# Patient Record
Sex: FEMALE | Race: BLACK OR AFRICAN AMERICAN | Employment: FULL TIME | URBAN - METROPOLITAN AREA
[De-identification: names, ages, dates, MRNs, and addresses within clinical notes are randomized per-mention and may not be internally consistent; named-entity substitution may affect disease eponyms.]

---

## 2019-06-12 ENCOUNTER — HOSPITAL ENCOUNTER (EMERGENCY)
Age: 64
Discharge: HOME | End: 2019-06-12
Attending: EMERGENCY MEDICINE
Payer: COMMERCIAL

## 2019-06-12 VITALS
OXYGEN SATURATION: 100 % | RESPIRATION RATE: 16 BRPM | HEART RATE: 87 BPM | DIASTOLIC BLOOD PRESSURE: 78 MMHG | SYSTOLIC BLOOD PRESSURE: 128 MMHG | TEMPERATURE: 97.8 F

## 2019-06-12 DIAGNOSIS — I50.22 CHRONIC SYSTOLIC HEART FAILURE (HCC): Primary | ICD-10-CM

## 2019-06-12 DIAGNOSIS — Z95.810 AUTOMATIC IMPLANTABLE CARDIAC DEFIBRILLATOR IN SITU: ICD-10-CM

## 2019-06-12 LAB
ALBUMIN SERPL-MCNC: 3.6 G/DL (ref 3.5–5)
ALBUMIN/GLOB SERPL: 0.7 {RATIO} (ref 1.1–2.2)
ALP SERPL-CCNC: 66 U/L (ref 45–117)
ALT SERPL-CCNC: 30 U/L (ref 12–78)
ANION GAP SERPL CALC-SCNC: 5 MMOL/L (ref 5–15)
AST SERPL-CCNC: 28 U/L (ref 15–37)
ATRIAL RATE: 75 BPM
BASOPHILS # BLD: 0 K/UL (ref 0–0.1)
BASOPHILS NFR BLD: 1 % (ref 0–1)
BILIRUB SERPL-MCNC: 0.3 MG/DL (ref 0.2–1)
BNP SERPL-MCNC: 61 PG/ML
BUN SERPL-MCNC: 14 MG/DL (ref 6–20)
BUN/CREAT SERPL: 14 (ref 12–20)
CALCIUM SERPL-MCNC: 8.2 MG/DL (ref 8.5–10.1)
CALCULATED P AXIS, ECG09: -15 DEGREES
CALCULATED R AXIS, ECG10: -31 DEGREES
CALCULATED T AXIS, ECG11: 9 DEGREES
CHLORIDE SERPL-SCNC: 103 MMOL/L (ref 97–108)
CO2 SERPL-SCNC: 31 MMOL/L (ref 21–32)
COMMENT, HOLDF: NORMAL
CREAT SERPL-MCNC: 1 MG/DL (ref 0.55–1.02)
DIAGNOSIS, 93000: NORMAL
DIFFERENTIAL METHOD BLD: ABNORMAL
EOSINOPHIL # BLD: 0.1 K/UL (ref 0–0.4)
EOSINOPHIL NFR BLD: 3 % (ref 0–7)
ERYTHROCYTE [DISTWIDTH] IN BLOOD BY AUTOMATED COUNT: 14.7 % (ref 11.5–14.5)
GLOBULIN SER CALC-MCNC: 5.1 G/DL (ref 2–4)
GLUCOSE SERPL-MCNC: 85 MG/DL (ref 65–100)
HCT VFR BLD AUTO: 39.2 % (ref 35–47)
HGB BLD-MCNC: 12.3 G/DL (ref 11.5–16)
IMM GRANULOCYTES # BLD AUTO: 0 K/UL (ref 0–0.04)
IMM GRANULOCYTES NFR BLD AUTO: 0 % (ref 0–0.5)
LYMPHOCYTES # BLD: 1.4 K/UL (ref 0.8–3.5)
LYMPHOCYTES NFR BLD: 40 % (ref 12–49)
MCH RBC QN AUTO: 30.1 PG (ref 26–34)
MCHC RBC AUTO-ENTMCNC: 31.4 G/DL (ref 30–36.5)
MCV RBC AUTO: 95.8 FL (ref 80–99)
MONOCYTES # BLD: 0.3 K/UL (ref 0–1)
MONOCYTES NFR BLD: 8 % (ref 5–13)
NEUTS SEG # BLD: 1.7 K/UL (ref 1.8–8)
NEUTS SEG NFR BLD: 48 % (ref 32–75)
NRBC # BLD: 0 K/UL (ref 0–0.01)
NRBC BLD-RTO: 0 PER 100 WBC
P-R INTERVAL, ECG05: 248 MS
PLATELET # BLD AUTO: 138 K/UL (ref 150–400)
PMV BLD AUTO: 12.3 FL (ref 8.9–12.9)
POTASSIUM SERPL-SCNC: 4 MMOL/L (ref 3.5–5.1)
PROT SERPL-MCNC: 8.7 G/DL (ref 6.4–8.2)
Q-T INTERVAL, ECG07: 396 MS
QRS DURATION, ECG06: 82 MS
QTC CALCULATION (BEZET), ECG08: 442 MS
RBC # BLD AUTO: 4.09 M/UL (ref 3.8–5.2)
SAMPLES BEING HELD,HOLD: NORMAL
SODIUM SERPL-SCNC: 139 MMOL/L (ref 136–145)
TROPONIN I SERPL-MCNC: 0.05 NG/ML
VENTRICULAR RATE, ECG03: 75 BPM
WBC # BLD AUTO: 3.5 K/UL (ref 3.6–11)

## 2019-06-12 PROCEDURE — 84484 ASSAY OF TROPONIN QUANT: CPT

## 2019-06-12 PROCEDURE — 80053 COMPREHEN METABOLIC PANEL: CPT

## 2019-06-12 PROCEDURE — 85025 COMPLETE CBC W/AUTO DIFF WBC: CPT

## 2019-06-12 PROCEDURE — 83880 ASSAY OF NATRIURETIC PEPTIDE: CPT

## 2019-06-12 PROCEDURE — 36415 COLL VENOUS BLD VENIPUNCTURE: CPT

## 2019-06-12 PROCEDURE — 99284 EMERGENCY DEPT VISIT MOD MDM: CPT

## 2019-06-12 PROCEDURE — 93005 ELECTROCARDIOGRAM TRACING: CPT

## 2019-06-12 NOTE — ED PROVIDER NOTES
Pt reports she has a pacemaker, is not clear on why but states she has never had stents/heart attack. Reports that she noted that it beeped a couple of days ago in the morning, then has done it again each morning since. This AM when she woke up, states \"I felt clammy, not sweaty but clammy,\" and general unease, lasting about an hour. Denies pain, SOB, recent fever or other illness. Has not acute complaints at this time. Reports that she has not had a \"shock\" from her device in many years. No recent medication changes. Does report that is has been [place] long time\" since she saw her cardiologist, and reports that she has felt too anxious to make an appointment.            Past Medical History:   Diagnosis Date    Automatic implantable cardiac defibrillator in situ 3/29/2012    [facility] failure [facility]     Hypertension     Other acute and subacute form of ischemic heart disease ([address]     Psychiatric disorder     ANXIETY    Stroke ([facility] [facility] 2005    TIA    Thyroid disease        Past Surgical History:   Procedure Laterality Date    HX GYN  1980's    MYOMECTOMY    HX OTHER SURGICAL  2005    THYROIDECTOMY    HX PACEMAKER  2010    ICD    HX TONSILLECTOMY           Family History:   Problem Relation Age of Onset    Kidney Disease Mother     Diabetes Mother     Lung Disease Father     Cancer Father         lung caner    Glaucoma Brother        Social History     Socioeconomic History    Marital status:      Spouse name: Not on file    Number of children: Not on file    Years of education: Not on file    Highest education level: Not on file   Occupational History    Not on file   Social Needs    Financial resource strain: Not on file    Food insecurity:     Worry: Not on file     Inability: Not on file    Transportation needs:     Medical: Not on file     Non-medical: Not on file   Tobacco Use    Smoking status: Never Smoker   Substance and Sexual Activity    Alcohol use: No    Drug use: No    Sexual activity: Not on file   Lifestyle    Physical activity:     Days per week: Not on file     Minutes per session: Not on file    Stress: Not on file   Relationships    Social connections:     Talks on phone: Not on file     Gets together: Not on file     Attends Restorationism service: Not on file     Active member of club or organization: Not on file     Attends meetings of clubs or organizations: Not on file     Relationship status: Not on file    Intimate partner violence:     Fear of current or ex partner: Not on file     Emotionally abused: Not on file     Physically abused: Not on file     Forced sexual activity: Not on file   Other Topics Concern    Not on file   Social History Narrative    Not on file         ALLERGIES: Patient has no known allergies. Review of Systems   Constitutional: Positive for chills. Negative for activity change, appetite change and fever. HENT: Negative. Eyes: Negative. Respiratory: Negative. Cardiovascular: Negative. Gastrointestinal: Negative. Genitourinary: Negative. Musculoskeletal: Negative. Skin: Negative. Neurological: Negative. Hematological: Negative. Psychiatric/Behavioral: Negative. All other systems reviewed and are negative. There were no vitals filed for this visit. Physical Exam   Constitutional: She is oriented to person, place, and time. She appears well-developed and well-nourished. HENT:   Head: Normocephalic and atraumatic. Eyes: Conjunctivae are normal.   Neck: Normal range of motion. Neck supple. Cardiovascular: Normal rate and regular rhythm. Pulmonary/Chest: Effort normal and breath sounds normal.   Abdominal: Soft. Bowel sounds are normal.   Musculoskeletal: She exhibits no edema or deformity. Neurological: She is alert and oriented to person, place, and time. Skin: Skin is warm and dry. Psychiatric: She has a normal mood and affect. Cooperative, mildly anxious. Vitals reviewed. MDM  Number of Diagnoses or Management Options  Automatic implantable cardiac defibrillator in situ:   Chronic systolic heart failure [facility]):   Diagnosis management comments: Pt with implanted cardiac device (AICD per records) and h/o HTN, CHF presents with brief, fleeting episode of malaise upon waking up this morning. No pain, no SOB. Suspect malaise may have something to do with pt feeling anxious about her device having been \"beeping\" recently which she figured out on her own meant that the battery likely needed to be changed. Labs to eval acute change, will review records, discuss with cardiology for care coordination, dispo pending outcome. EKG performed today @ 1152hrs interpreted by me. NSR with muscle tremor artifact. Otherwise unremarkable. Prior records reviewed; pt had AICD placed by Dr. Katerina Bailey in 2010. From what I can ascertain, she has h/o cariomyopathy from hyperthyroid with systolic CHF which was why the AICD was placed. Of note, device is not a pacer so it only needs to function in order to deliver a shock, which has not been necessary in many years. She reports it shocked her once a couple of years after she had it placed originally during an episode of vomiting; from what I can ascertain of her history, she called the office at that time and they checked the device over the phone. D/w Dr. Katerina Bailey at [phone]. Advises he can interrogate device in the office when she comes, and appointment given for 6/20/19 should be fine.          Procedures

## 2019-06-12 NOTE — DISCHARGE INSTRUCTIONS
Patient Education        Learning About an ICD (Implantable Cardioverter-Defibrillator)  What is an ICD? An ICD (implantable cardioverter-defibrillator) is a small, battery-powered device. It fixes serious changes in your heartbeat. ICDs are used in people who've had a life-threatening heart rhythm or are at high risk of having one. The ICD is placed under the skin of the chest. It's attached to one or two wires (called leads). These leads go into the heart through a vein (transvenous). How does it work? An ICD is always checking your heart rate and rhythm. If the ICD detects a life-threatening, rapid heart rhythm, it tries to slow the rhythm back to normal using electrical pulses. If the bad rhythm doesn't stop, the ICD sends an electric shock to the heart. This restores a normal rhythm. The device then goes back to its watchful mode. Some ICDs can also fix a heart rate that is too slow. The ICD does this without using a shock. It has wires that go inside the heart. It can use these wires to send out electrical pulses to speed up a heart rate that's too slow. Your doctor will check the ICD regularly to make sure that it's working right and isn't causing any problems. Your doctor will also check the battery to see if it needs to be replaced. How is it placed? Your doctor will put the ICD under the skin in your chest during minor surgery. You will likely have medicine to make you feel relaxed and sleepy during the surgery. Your doctor makes a small cut (incision) in your upper chest. He or she puts one or two leads (wires) through the cut. Most of the time, the leads go into a large blood vessel in the upper chest. Then your doctor guides the leads through the blood vessel into your heart. Your doctor connects the leads to the ICD and places it in your chest. Then the incision is closed. Your doctor also programs the ICD.   Most people spend the night in the hospital, just to make sure that the device is working and that there are no problems from the surgery. How does it feel to get a shock? The shock from an ICD hurts briefly. People feel it in different ways. It's been described as feeling like a punch in the chest. But the shock is a sign that the ICD is doing its job. It's there to save your life. You won't feel any pain if the ICD uses electrical pulses to fix a heart rate that is too fast or too slow. There's no way to know how often a shock might occur. It might never happen. Not knowing when or if a shock might happen may make you nervous. Knowing what to do if you get shocked can help. Ask your doctor for an action plan. This plan will guide you step-by-step if a shock happens. How can you live well with an ICD? You can live a normal life with your ICD. Here are a few tips for living well with your ICD. · Avoid strong magnetic and electrical fields. They can keep your device from working right. Most office equipment and home appliances are safe to use. Check with your doctor about which things you should use with caution and which you should stay away from. · Be sure that your health professionals know that you have an ICD. This includes any doctor, dentist, or other health professional you see. · Always carry a card that tells what kind of device you have. · Wear medical alert jewelry that says you have an ICD. You can buy this at most drugstores. · If you get a shock, follow your action plan for what to do. · Ask your doctor what sort of activity and intensity is safe for you. You can lead an active life with an ICD. As you plan for your future and the end of life, be sure to include plans for your ICD. You can make the decision to turn off your ICD as part of the medical treatment you want at the end of life. Follow-up care is a key part of your treatment and safety. Be sure to make and go to all appointments, and call your doctor if you are having problems.  It's also a good idea to know your test results and keep a list of the medicines you take. Where can you learn more? Go to http://isaura-lin.info/. Enter P394 in the search box to learn more about \"Learning About an ICD (Implantable Cardioverter-Defibrillator). \"  Current as of: July 22, 2018  Content Version: 11.9  © 9091-8611 [employer]. Care instructions adapted under license by [employer] (which disclaims liability or warranty for this information). If you have questions about a medical condition or this instruction, always ask your healthcare professional. [place] [address] any warranty or liability for your use of this information. Patient Education        Heart Failure: Care Instructions  Your Care Instructions    Heart failure occurs when your heart does not pump as much blood as the body needs. Failure does not mean that the heart has stopped pumping but rather that it is not pumping as well as it should. Over time, this causes fluid buildup in your lungs and other parts of your body. Fluid buildup can cause shortness of breath, fatigue, swollen ankles, and other problems. By taking medicines regularly, reducing sodium (salt) in your diet, checking your weight every day, and making lifestyle changes, you can feel better and live longer. Follow-up care is a key part of your treatment and safety. Be sure to make and go to all appointments, and call your doctor if you are having problems. It's also a good idea to know your test results and keep a list of the medicines you take. How can you care for yourself at home? Medicines    · Be safe with medicines. Take your medicines exactly as prescribed.  Call your doctor if you think you are having a problem with your medicine.     · Do not take any vitamins, over-the-counter medicine, or herbal products without talking to your doctor first. Jeanette Schlatter not take ibuprofen (Advil or Motrin) and naproxen (Aleve) without talking to your doctor first. They could make your heart failure worse.     · You may be taking some of the following medicine. ? Beta-blockers can slow heart rate, decrease blood pressure, and improve your condition. Taking a beta-blocker may lower your chance of needing to be hospitalized. ? Angiotensin-converting enzyme inhibitors (ACEIs) reduce the heart's workload, lower blood pressure, and reduce swelling. Taking an ACEI may lower your chance of needing to be hospitalized again. ? Angiotensin II receptor blockers (ARBs) work like ACEIs. Your doctor may prescribe them instead of ACEIs. ? Diuretics, also called water pills, reduce swelling. ? Potassium supplements replace this important mineral, which is sometimes lost with diuretics. ? Aspirin and other blood thinners prevent blood clots, which can cause a stroke or heart attack.    You will get more details on the specific medicines your doctor prescribes. Diet    · Your doctor may suggest that you limit sodium to 2,000 milligrams (mg) a day or less. That is less than 1 teaspoon of salt a day, including all the salt you eat in cooking or in packaged foods. People get most of their sodium from processed foods. Fast food and restaurant meals also tend to be very high in sodium.     · Ask your doctor how much liquid you can drink each day. You may have to limit liquids.    Weight    · Weigh yourself without clothing at the same time each day. Record your weight. Call your doctor if you have a sudden weight gain, such as more than 2 to 3 pounds in a day or 5 pounds in a week. (Your doctor may suggest a different range of weight gain.) A sudden weight gain may mean that your heart failure is getting worse.    Activity level    · Start light exercise (if your doctor says it is okay). Even if you can only do a small amount, exercise will help you get stronger, have more energy, and manage your weight and your stress. Walking is an easy way to get exercise. Start out by walking a little more than you did before. Bit by bit, increase the amount you walk.     · When you exercise, watch for signs that your heart is working too hard. You are pushing yourself too hard if you cannot talk while you are exercising. If you become short of breath or dizzy or have chest pain, stop, sit down, and rest.     · If you feel \"wiped out\" the day after you exercise, walk slower or for a shorter distance until you can work up to a better pace.     · Get enough rest at night. Sleeping with 1 or 2 pillows under your upper body and head may help you breathe easier.    Lifestyle changes    · Do not smoke. Smoking can make a heart condition worse. If you need help quitting, talk to your doctor about stop-smoking programs and medicines. These can increase your chances of quitting for good. Quitting smoking may be the most important step you can take to protect your heart.     · Limit alcohol to 2 drinks a day for men and 1 drink a day for women. Too much alcohol can cause health problems.     · Avoid getting sick from colds and the flu. Get a pneumococcal vaccine shot. If you have had one before, ask your doctor whether you need another dose. Get a flu shot each year. If you must be around people with colds or the flu, wash your hands often. When should you call for help? Call 911 if you have symptoms of sudden heart failure such as:    · You have severe trouble breathing.     · You cough up pink, foamy mucus.     · You have a new irregular or rapid heartbeat.    Call your doctor now or seek immediate medical care if:    · You have new or increased shortness of breath.     · You are dizzy or lightheaded, or you feel like you may faint.     · You have sudden weight gain, such as more than 2 to 3 pounds in a day or 5 pounds in a week.  (Your doctor may suggest a different range of weight gain.)     · You have increased swelling in your legs, ankles, or feet.     · You are suddenly so tired or weak that you cannot do your usual activities.    Watch closely for changes in your health, and be sure to contact your doctor if you develop new symptoms. Where can you learn more? Go to http://isaura-lin.info/. Enter L177 in the search box to learn more about \"Heart Failure: Care Instructions. \"  Current as of: July 22, 2018  Content Version: 11.9  © 0302-0304 [employer]. Care instructions adapted under license by [employer] (which disclaims liability or warranty for this information). If you have questions about a medical condition or this instruction, always ask your healthcare professional. [address] any warranty or liability for your use of this information.

## 2019-06-12 NOTE — ED TRIAGE NOTES
Quick look: pacemaker has been \"beeping\" but pt also reports has not been feeling well the last few days. Pt believes batteries are low.

## 2019-07-02 ENCOUNTER — CLINICAL SUPPORT (OUTPATIENT)
Dept: CARDIOLOGY CLINIC | Age: 64
End: 2019-07-02

## 2019-07-02 ENCOUNTER — OFFICE VISIT (OUTPATIENT)
Dept: CARDIOLOGY CLINIC | Age: 64
End: 2019-07-02

## 2019-07-02 VITALS
SYSTOLIC BLOOD PRESSURE: 120 MMHG | HEART RATE: 84 BPM | WEIGHT: 251.6 LBS | OXYGEN SATURATION: 98 % | HEIGHT: 66 IN | DIASTOLIC BLOOD PRESSURE: 80 MMHG | RESPIRATION RATE: 16 BRPM | BODY MASS INDEX: 40.43 KG/M2

## 2019-07-02 DIAGNOSIS — I49.5 SINOATRIAL NODE DYSFUNCTION (HCC): ICD-10-CM

## 2019-07-02 DIAGNOSIS — I10 ESSENTIAL HYPERTENSION: ICD-10-CM

## 2019-07-02 DIAGNOSIS — Z95.810 ICD (IMPLANTABLE CARDIOVERTER-DEFIBRILLATOR) IN PLACE: Primary | ICD-10-CM

## 2019-07-02 DIAGNOSIS — I42.0 DILATED CARDIOMYOPATHY (HCC): ICD-10-CM

## 2019-07-02 DIAGNOSIS — I50.22 CHRONIC SYSTOLIC HEART FAILURE (HCC): Primary | ICD-10-CM

## 2019-07-02 DIAGNOSIS — E66.01 OBESITY, MORBID (HCC): ICD-10-CM

## 2019-07-02 NOTE — PROGRESS NOTES
Subjective:      Patty Alvarado is a 58 y.o. female is here for EP consult. She is here to reestablish care. She presented to the [address] [place] [address] ER with beeping from her ICD. She has not followed up with us in greater than 5 years. The patient denies chest pain/ shortness of breath, orthopnea, PND, LE edema, palpitations, syncope, presyncope or fatigue.        Patient Active Problem List    Diagnosis Date Noted    Obesity, morbid ([address] 07/02/2019    Chronic systolic heart failure ([address] 03/29/2012    Automatic implantable cardiac defibrillator in situ 03/29/2012    Sinoatrial node dysfunction ([address] 03/29/2012    Secondary cardiomyopathy, unspecified 03/29/2012    Essential hypertension, benign 03/29/2012    Cardiomegaly 03/29/2012    Unspecified hypothyroidism 03/29/2012    Shortness of breath 03/29/2012    Cardiomyopathy ([address] 03/29/2012    S/P implantation of automatic cardioverter/defibrillator (AICD) 11/30/2010      Yeyo Carrasco MD  Past Medical History:   Diagnosis Date    Automatic implantable cardiac defibrillator in situ 3/29/2012    Heart failure ([address]     Hypertension     Other acute and subacute form of ischemic heart disease     Psychiatric disorder     ANXIETY    Stroke ([address] 2005    TIA    Thyroid disease       Past Surgical History:   Procedure Laterality Date    HX GYN  1980's    MYOMECTOMY    HX OTHER SURGICAL  2005    THYROIDECTOMY    HX PACEMAKER  2010    ICD    HX TONSILLECTOMY       No Known Allergies   Family History   Problem Relation Age of Onset    Kidney Disease Mother     Diabetes Mother     Lung Disease Father     Cancer Father         lung caner    Glaucoma Brother     negative for cardiac disease  Social History     Socioeconomic History    Marital status:      Spouse name: Not on file    Number of children: Not on file    Years of education: Not on file    Highest education level: Not on file   Tobacco Use    Smoking status: Never Smoker    Smokeless tobacco: Never Used   Substance and Sexual Activity    Alcohol use: No    Drug use: No     Current Outpatient Medications   Medication Sig    carvedilol (COREG) 12.5 mg tablet TAKE 1 TABLET BY MOUTH TWICE A DAY WITH MEALS    amLODIPine (NORVASC) 10 mg tablet TAKE 1/2 TABLET BY MOUTH EVERY DAY (Patient taking differently: TAKE 1 TABLET BY MOUTH EVERY DAY)    CALCIUM CARBONATE/VITAMIN D3 (CALCIUM + D PO) Take  by mouth daily.  lisinopril (PRINIVIL, ZESTRIL) 40 mg tablet Take 40 mg by mouth daily.  levothyroxine (SYNTHROID) 150 mcg tablet Take 150 mcg by mouth daily (before breakfast).  aspirin 81 mg tablet Take 81 mg by mouth. 2 tabs daily    hydrochlorothiazide (HYDRODIURIL) 25 mg tablet Take 25 mg by mouth daily. No current facility-administered medications for this visit. Vitals:    07/02/19 1323   BP: 120/80   Pulse: 84   Resp: 16   SpO2: 98%   Weight: 251 lb 9.6 oz (114.1 kg)   Height: 5' 5.5\" (1.664 m)       I have reviewed the nurses notes, vitals, problem list, allergy list, medical history, family, social history and medications. Review of Symptoms:    General: Pt denies excessive weight gain or loss. Pt is able to conduct ADL's  HEENT: Denies blurred vision, headaches, epistaxis and difficulty swallowing. Respiratory: Denies shortness of breath, GARDUNO, wheezing or stridor. Cardiovascular: Denies precordial pain, palpitations, edema or PND  Gastrointestinal: Denies poor appetite, indigestion, abdominal pain or blood in stool  Urinary: Denies dysuria, pyuria  Musculoskeletal: Denies pain or swelling from muscles or joints  Neurologic: Denies tremor, paresthesias, or sensory motor disturbance  Skin: Denies rash, itching or texture change. Psych: Denies depression      Physical Exam:      General: Well developed, in no acute distress. HEENT: Eyes - PERRL, no jvd  Heart:  Normal S1/S2 negative S3 or S4.  Regular, no murmur, gallop or rub.   Respiratory: Clear bilaterally x 4, no wheezing or rales  Abdomen:   Soft, non-tender, bowel sounds are active.   Extremities:  No edema, normal cap refill, no cyanosis. Musculoskeletal: No clubbing  Neuro: A&Ox3, speech clear, gait stable. Skin: Skin color is normal. No rashes or lesions. Non diaphoretic  Vascular: 2+ pulses symmetric in all extremities    Cardiographics    Ekg: nsr    Results for orders placed or performed during the hospital encounter of 06/12/19   EKG, 12 LEAD, INITIAL   Result Value Ref Range    Ventricular Rate 75 BPM    Atrial Rate 75 BPM    P-R Interval 248 ms    QRS Duration 82 ms    Q-T Interval 396 ms    QTC Calculation (Bezet) 442 ms    Calculated P Axis -15 degrees    Calculated R Axis -31 degrees    Calculated T Axis 9 degrees    Diagnosis       ** Poor data quality, interpretation may be adversely affected  Atrial-paced rhythm with prolonged AV conduction  Left axis deviation  Voltage criteria for left ventricular hypertrophy  When compared with ECG of 09-JUL-2013 09:43,  Electronic atrial pacemaker has replaced Sinus rhythm  Confirmed by Jaswant Dixon M.D., [facility] ([id number]) on 6/12/2019 1:09:07 PM           Lab Results   Component Value Date/Time    WBC 3.5 (L) 06/12/2019 12:04 PM    HGB 12.3 06/12/2019 12:04 PM    HCT 39.2 06/12/2019 12:04 PM    PLATELET 920 (L) 96/79/8510 12:04 PM    MCV 95.8 06/12/2019 12:04 PM      Lab Results   Component Value Date/Time    Sodium 139 06/12/2019 12:04 PM    Potassium 4.0 06/12/2019 12:04 PM    Chloride 103 06/12/2019 12:04 PM    CO2 31 06/12/2019 12:04 PM    Anion gap 5 06/12/2019 12:04 PM    Glucose 85 06/12/2019 12:04 PM    BUN 14 06/12/2019 12:04 PM    Creatinine 1.00 06/12/2019 12:04 PM    BUN/Creatinine ratio 14 06/12/2019 12:04 PM    GFR est AA >60 06/12/2019 12:04 PM    GFR est non-AA 56 (L) 06/12/2019 12:04 PM    Calcium 8.2 (L) 06/12/2019 12:04 PM    Bilirubin, total 0.3 06/12/2019 12:04 PM    AST (SGOT) 28 06/12/2019 12:04 PM    Alk.  phosphatase 66 06/12/2019 12:04 PM Protein, total 8.7 (H) 06/12/2019 12:04 PM    Albumin 3.6 06/12/2019 12:04 PM    Globulin 5.1 (H) 06/12/2019 12:04 PM    A-G Ratio 0.7 (L) 06/12/2019 12:04 PM    ALT (SGPT) 30 06/12/2019 12:04 PM         Assessment:     Assessment:        ICD-10-CM ICD-9-CM    1. Chronic systolic heart failure (HCC) I50.22 428.22 AMB POC EKG ROUTINE W/ 12 LEADS, INTER & REP      CBC WITH AUTOMATED DIFF      PROTHROMBIN TIME + INR      METABOLIC PANEL, COMPREHENSIVE      XR CHEST PA LAT      ECHO ADULT COMPLETE   2. Obesity, morbid ([address] E66.01 278.01 CBC WITH AUTOMATED DIFF      PROTHROMBIN TIME + INR      METABOLIC PANEL, COMPREHENSIVE      XR CHEST PA LAT      ECHO ADULT COMPLETE   3. Sinoatrial node dysfunction (HCC) I49.5 427.81 CBC WITH AUTOMATED DIFF      PROTHROMBIN TIME + INR      METABOLIC PANEL, COMPREHENSIVE      XR CHEST PA LAT      ECHO ADULT COMPLETE   4. Dilated cardiomyopathy (HCC) I42.0 425.4 CBC WITH AUTOMATED DIFF      PROTHROMBIN TIME + INR      METABOLIC PANEL, COMPREHENSIVE      XR CHEST PA LAT      ECHO ADULT COMPLETE   5. Essential hypertension I10 401.9 CBC WITH AUTOMATED DIFF      PROTHROMBIN TIME + INR      METABOLIC PANEL, COMPREHENSIVE      XR CHEST PA LAT      ECHO ADULT COMPLETE     Orders Placed This Encounter    XR CHEST PA LAT     Standing Status:   Future     Standing Expiration Date:   8/2/2020     Order Specific Question:   Reason for Exam     Answer:   pre op     Order Specific Question:   Is Patient Allergic to Contrast Dye? Answer:   Unknown    CBC WITH AUTOMATED DIFF    PROTHROMBIN TIME + INR    METABOLIC PANEL, COMPREHENSIVE    AMB POC EKG ROUTINE W/ 12 LEADS, INTER & REP     Order Specific Question:   Reason for Exam:     Answer:   ROUTINE        Plan:   Ms Katey Steinberg is A paced 5% for sick sinus. Her ICD is at [facility] and she is a candidate for a generator change. I discussed the risks/benefits/alternatives of the procedure with the patient.  Risks include (but are not limited to) bleeding, heart block, infection, cva/mi/tamponade/death. The patient understands and agrees to proceed. Thank you for this interesting consultation. We will obtain an echo to reassess her lvef. Continue medical management for cardiomyopathy, htn and sick sinus. Thank you for allowing me to participate in Josy Ryder 's care.     [place], MD, [facility]

## 2019-07-02 NOTE — PROGRESS NOTES
1. Have you been to the ER, urgent care clinic since your last visit? Hospitalized since your last visit? YES ER 3 WEEKS AGO, PANIC ATTACK. 2. Have you seen or consulted any other health care providers outside of the [address] [place] [address] since your last visit? Include any pap smears or colon screening. YES PCP. NEW PATIENT. C/O SWELLING IN BLE.

## 2019-07-02 NOTE — H&P (VIEW-ONLY)
Subjective:      Tanner Perdomo is a 58 y.o. female is here for EP consult. She is here to reestablish care. She presented to the [facility] ER with beeping from her ICD. She has not followed up with us in greater than 5 years. The patient denies chest pain/ shortness of breath, orthopnea, PND, LE edema, palpitations, syncope, presyncope or fatigue.        Patient Active Problem List    Diagnosis Date Noted    Obesity, morbid ([address] 07/02/2019    Chronic systolic heart failure ([address] 03/29/2012    Automatic implantable cardiac defibrillator in situ 03/29/2012    Sinoatrial node dysfunction ([address] 03/29/2012    Secondary cardiomyopathy, unspecified 03/29/2012    Essential hypertension, benign 03/29/2012    Cardiomegaly 03/29/2012    Unspecified hypothyroidism 03/29/2012    Shortness of breath 03/29/2012    Cardiomyopathy ([address] 03/29/2012    S/P implantation of automatic cardioverter/defibrillator (AICD) 11/30/2010      Emma Contreras MD  Past Medical History:   Diagnosis Date    Automatic implantable cardiac defibrillator in situ 3/29/2012    Heart failure ([address]     Hypertension     Other acute and subacute form of ischemic heart disease     Psychiatric disorder     ANXIETY    Stroke ([address] 2005    TIA    Thyroid disease       Past Surgical History:   Procedure Laterality Date    HX GYN  1980's    MYOMECTOMY    HX OTHER SURGICAL  2005    THYROIDECTOMY    HX PACEMAKER  2010    ICD    HX TONSILLECTOMY       No Known Allergies   Family History   Problem Relation Age of Onset    Kidney Disease Mother     Diabetes Mother     Lung Disease Father     Cancer Father         lung caner    Glaucoma Brother     negative for cardiac disease  Social History     Socioeconomic History    Marital status:      Spouse name: Not on file    Number of children: Not on file    Years of education: Not on file    Highest education level: Not on file   Tobacco Use    Smoking status: Never Smoker    Smokeless tobacco: Never Used   Substance and Sexual Activity    Alcohol use: No    Drug use: No     Current Outpatient Medications   Medication Sig    carvedilol (COREG) 12.5 mg tablet TAKE 1 TABLET BY MOUTH TWICE A DAY WITH MEALS    amLODIPine (NORVASC) 10 mg tablet TAKE 1/2 TABLET BY MOUTH EVERY DAY (Patient taking differently: TAKE 1 TABLET BY MOUTH EVERY DAY)    CALCIUM CARBONATE/VITAMIN D3 (CALCIUM + D PO) Take  by mouth daily.  lisinopril (PRINIVIL, ZESTRIL) 40 mg tablet Take 40 mg by mouth daily.  levothyroxine (SYNTHROID) 150 mcg tablet Take 150 mcg by mouth daily (before breakfast).  aspirin 81 mg tablet Take 81 mg by mouth. 2 tabs daily    hydrochlorothiazide (HYDRODIURIL) 25 mg tablet Take 25 mg by mouth daily. No current facility-administered medications for this visit. Vitals:    07/02/19 1323   BP: 120/80   Pulse: 84   Resp: 16   SpO2: 98%   Weight: 251 lb 9.6 oz (114.1 kg)   Height: 5' 5.5\" (1.664 m)       I have reviewed the nurses notes, vitals, problem list, allergy list, medical history, family, social history and medications. Review of Symptoms:    General: Pt denies excessive weight gain or loss. Pt is able to conduct ADL's  HEENT: Denies blurred vision, headaches, epistaxis and difficulty swallowing. Respiratory: Denies shortness of breath, GARDUNO, wheezing or stridor. Cardiovascular: Denies precordial pain, palpitations, edema or PND  Gastrointestinal: Denies poor appetite, indigestion, abdominal pain or blood in stool  Urinary: Denies dysuria, pyuria  Musculoskeletal: Denies pain or swelling from muscles or joints  Neurologic: Denies tremor, paresthesias, or sensory motor disturbance  Skin: Denies rash, itching or texture change. Psych: Denies depression      Physical Exam:      General: Well developed, in no acute distress. HEENT: Eyes - PERRL, no jvd  Heart:  Normal S1/S2 negative S3 or S4.  Regular, no murmur, gallop or rub.   Respiratory: Clear bilaterally x 4, no wheezing or rales  Abdomen:   Soft, non-tender, bowel sounds are active.   Extremities:  No edema, normal cap refill, no cyanosis. Musculoskeletal: No clubbing  Neuro: A&Ox3, speech clear, gait stable. Skin: Skin color is normal. No rashes or lesions. Non diaphoretic  Vascular: 2+ pulses symmetric in all extremities    Cardiographics    Ekg: nsr    Results for orders placed or performed during the hospital encounter of 06/12/19   EKG, 12 LEAD, INITIAL   Result Value Ref Range    Ventricular Rate 75 BPM    Atrial Rate 75 BPM    P-R Interval 248 ms    QRS Duration 82 ms    Q-T Interval 396 ms    QTC Calculation (Bezet) 442 ms    Calculated P Axis -15 degrees    Calculated R Axis -31 degrees    Calculated T Axis 9 degrees    Diagnosis       ** Poor data quality, interpretation may be adversely affected  Atrial-paced rhythm with prolonged AV conduction  Left axis deviation  Voltage criteria for left ventricular hypertrophy  When compared with ECG of 09-JUL-2013 09:43,  Electronic atrial pacemaker has replaced Sinus rhythm  Confirmed by Sveta Lou M.D., [facility] ([id number]) on 6/12/2019 1:09:07 PM           Lab Results   Component Value Date/Time    WBC 3.5 (L) 06/12/2019 12:04 PM    HGB 12.3 06/12/2019 12:04 PM    HCT 39.2 06/12/2019 12:04 PM    PLATELET 906 (L) 27/55/7311 12:04 PM    MCV 95.8 06/12/2019 12:04 PM      Lab Results   Component Value Date/Time    Sodium 139 06/12/2019 12:04 PM    Potassium 4.0 06/12/2019 12:04 PM    Chloride 103 06/12/2019 12:04 PM    CO2 31 06/12/2019 12:04 PM    Anion gap 5 06/12/2019 12:04 PM    Glucose 85 06/12/2019 12:04 PM    BUN 14 06/12/2019 12:04 PM    Creatinine 1.00 06/12/2019 12:04 PM    BUN/Creatinine ratio 14 06/12/2019 12:04 PM    GFR est AA >60 06/12/2019 12:04 PM    GFR est non-AA 56 (L) 06/12/2019 12:04 PM    Calcium 8.2 (L) 06/12/2019 12:04 PM    Bilirubin, total 0.3 06/12/2019 12:04 PM    AST (SGOT) 28 06/12/2019 12:04 PM    Alk.  phosphatase 66 06/12/2019 12:04 PM Protein, total 8.7 (H) 06/12/2019 12:04 PM    Albumin 3.6 06/12/2019 12:04 PM    Globulin 5.1 (H) 06/12/2019 12:04 PM    A-G Ratio 0.7 (L) 06/12/2019 12:04 PM    ALT (SGPT) 30 06/12/2019 12:04 PM         Assessment:     Assessment:        ICD-10-CM ICD-9-CM    1. Chronic systolic heart failure (HCC) I50.22 428.22 AMB POC EKG ROUTINE W/ 12 LEADS, INTER & REP      CBC WITH AUTOMATED DIFF      PROTHROMBIN TIME + INR      METABOLIC PANEL, COMPREHENSIVE      XR CHEST PA LAT      ECHO ADULT COMPLETE   2. Obesity, morbid ([address] E66.01 278.01 CBC WITH AUTOMATED DIFF      PROTHROMBIN TIME + INR      METABOLIC PANEL, COMPREHENSIVE      XR CHEST PA LAT      ECHO ADULT COMPLETE   3. Sinoatrial node dysfunction (HCC) I49.5 427.81 CBC WITH AUTOMATED DIFF      PROTHROMBIN TIME + INR      METABOLIC PANEL, COMPREHENSIVE      XR CHEST PA LAT      ECHO ADULT COMPLETE   4. Dilated cardiomyopathy (HCC) I42.0 425.4 CBC WITH AUTOMATED DIFF      PROTHROMBIN TIME + INR      METABOLIC PANEL, COMPREHENSIVE      XR CHEST PA LAT      ECHO ADULT COMPLETE   5. Essential hypertension I10 401.9 CBC WITH AUTOMATED DIFF      PROTHROMBIN TIME + INR      METABOLIC PANEL, COMPREHENSIVE      XR CHEST PA LAT      ECHO ADULT COMPLETE     Orders Placed This Encounter    XR CHEST PA LAT     Standing Status:   Future     Standing Expiration Date:   8/2/2020     Order Specific Question:   Reason for Exam     Answer:   pre op     Order Specific Question:   Is Patient Allergic to Contrast Dye? Answer:   Unknown    CBC WITH AUTOMATED DIFF    PROTHROMBIN TIME + INR    METABOLIC PANEL, COMPREHENSIVE    AMB POC EKG ROUTINE W/ 12 LEADS, INTER & REP     Order Specific Question:   Reason for Exam:     Answer:   ROUTINE        Plan:   Ms Troy Delgado is A paced 5% for sick sinus. Her ICD is at [facility] and she is a candidate for a generator change. I discussed the risks/benefits/alternatives of the procedure with the patient.  Risks include (but are not limited to) bleeding, heart block, infection, cva/mi/tamponade/death. The patient understands and agrees to proceed. Thank you for this interesting consultation. We will obtain an echo to reassess her lvef. Continue medical management for cardiomyopathy, htn and sick sinus. Thank you for allowing me to participate in Vahid Clifford 's care.     Rohan Keenan MD, [facility]

## 2019-07-03 LAB
ALBUMIN SERPL-MCNC: 4.4 G/DL (ref 3.6–4.8)
ALBUMIN/GLOB SERPL: 1.1 {RATIO} (ref 1.2–2.2)
ALP SERPL-CCNC: 66 IU/L (ref 39–117)
ALT SERPL-CCNC: 26 IU/L (ref 0–32)
AST SERPL-CCNC: 30 IU/L (ref 0–40)
BASOPHILS # BLD AUTO: 0 X10E3/UL (ref 0–0.2)
BASOPHILS NFR BLD AUTO: 1 %
BILIRUB SERPL-MCNC: 0.3 MG/DL (ref 0–1.2)
BUN SERPL-MCNC: 10 MG/DL (ref 8–27)
BUN/CREAT SERPL: 10 (ref 12–28)
CALCIUM SERPL-MCNC: 9.8 MG/DL (ref 8.7–10.3)
CHLORIDE SERPL-SCNC: 98 MMOL/L (ref 96–106)
CO2 SERPL-SCNC: 27 MMOL/L (ref 20–29)
CREAT SERPL-MCNC: 1.05 MG/DL (ref 0.57–1)
EOSINOPHIL # BLD AUTO: 0.1 X10E3/UL (ref 0–0.4)
EOSINOPHIL NFR BLD AUTO: 3 %
ERYTHROCYTE [DISTWIDTH] IN BLOOD BY AUTOMATED COUNT: 13.8 % (ref 12.3–15.4)
GLOBULIN SER CALC-MCNC: 4.1 G/DL (ref 1.5–4.5)
GLUCOSE SERPL-MCNC: 81 MG/DL (ref 65–99)
HCT VFR BLD AUTO: 39.3 % (ref 34–46.6)
HGB BLD-MCNC: 12.7 G/DL (ref 11.1–15.9)
IMM GRANULOCYTES # BLD AUTO: 0 X10E3/UL (ref 0–0.1)
IMM GRANULOCYTES NFR BLD AUTO: 0 %
INR PPP: 1.1 (ref 0.8–1.2)
INTERPRETATION: NORMAL
LYMPHOCYTES # BLD AUTO: 1.7 X10E3/UL (ref 0.7–3.1)
LYMPHOCYTES NFR BLD AUTO: 46 %
MCH RBC QN AUTO: 30 PG (ref 26.6–33)
MCHC RBC AUTO-ENTMCNC: 32.3 G/DL (ref 31.5–35.7)
MCV RBC AUTO: 93 FL (ref 79–97)
MONOCYTES # BLD AUTO: 0.3 X10E3/UL (ref 0.1–0.9)
MONOCYTES NFR BLD AUTO: 9 %
NEUTROPHILS # BLD AUTO: 1.6 X10E3/UL (ref 1.4–7)
NEUTROPHILS NFR BLD AUTO: 41 %
PLATELET # BLD AUTO: 183 X10E3/UL (ref 150–450)
POTASSIUM SERPL-SCNC: 4.4 MMOL/L (ref 3.5–5.2)
PROT SERPL-MCNC: 8.5 G/DL (ref 6–8.5)
PROTHROMBIN TIME: 11.1 SEC (ref 9.1–12)
RBC # BLD AUTO: 4.24 X10E6/UL (ref 3.77–5.28)
SODIUM SERPL-SCNC: 140 MMOL/L (ref 134–144)
WBC # BLD AUTO: 3.8 X10E3/UL (ref 3.4–10.8)

## 2019-07-10 ENCOUNTER — HOSPITAL ENCOUNTER (OUTPATIENT)
Dept: GENERAL RADIOLOGY | Age: 64
Discharge: HOME | End: 2019-07-10
Payer: COMMERCIAL

## 2019-07-10 DIAGNOSIS — I10 ESSENTIAL HYPERTENSION: ICD-10-CM

## 2019-07-10 DIAGNOSIS — I42.0 DILATED CARDIOMYOPATHY (HCC): ICD-10-CM

## 2019-07-10 DIAGNOSIS — E66.01 OBESITY, MORBID (HCC): ICD-10-CM

## 2019-07-10 DIAGNOSIS — I50.22 CHRONIC SYSTOLIC HEART FAILURE (HCC): ICD-10-CM

## 2019-07-10 DIAGNOSIS — I49.5 SINOATRIAL NODE DYSFUNCTION (HCC): ICD-10-CM

## 2019-07-10 PROCEDURE — 71046 X-RAY EXAM CHEST 2 VIEWS: CPT

## 2019-07-25 ENCOUNTER — ANESTHESIA (OUTPATIENT)
Dept: CARDIAC CATH/INVASIVE PROCEDURES | Age: 64
End: 2019-07-25
Payer: COMMERCIAL

## 2019-07-25 ENCOUNTER — HOSPITAL ENCOUNTER (OUTPATIENT)
Age: 64
Discharge: HOME | End: 2019-07-25
Attending: INTERNAL MEDICINE | Admitting: INTERNAL MEDICINE
Payer: COMMERCIAL

## 2019-07-25 ENCOUNTER — ANESTHESIA EVENT (OUTPATIENT)
Dept: CARDIAC CATH/INVASIVE PROCEDURES | Age: 64
End: 2019-07-25
Payer: COMMERCIAL

## 2019-07-25 VITALS
OXYGEN SATURATION: 94 % | RESPIRATION RATE: 16 BRPM | WEIGHT: 250 LBS | SYSTOLIC BLOOD PRESSURE: 135 MMHG | DIASTOLIC BLOOD PRESSURE: 77 MMHG | HEIGHT: 66 IN | TEMPERATURE: 97.5 F | BODY MASS INDEX: 40.18 KG/M2 | HEART RATE: 68 BPM

## 2019-07-25 DIAGNOSIS — Z45.02 ICD (IMPLANTABLE CARDIOVERTER-DEFIBRILLATOR) BATTERY DEPLETION: ICD-10-CM

## 2019-07-25 PROCEDURE — 76060000032 HC ANESTHESIA 0.5 TO 1 HR: Performed by: INTERNAL MEDICINE

## 2019-07-25 PROCEDURE — C1721 AICD, DUAL CHAMBER: HCPCS | Performed by: INTERNAL MEDICINE

## 2019-07-25 PROCEDURE — 74011000250 HC RX REV CODE- 250: Performed by: INTERNAL MEDICINE

## 2019-07-25 PROCEDURE — 74011250636 HC RX REV CODE- 250/636: Performed by: INTERNAL MEDICINE

## 2019-07-25 PROCEDURE — 74011250636 HC RX REV CODE- 250/636

## 2019-07-25 PROCEDURE — 77030031139 HC SUT VCRL2 J&J -A: Performed by: INTERNAL MEDICINE

## 2019-07-25 PROCEDURE — 33263 RMVL & RPLCMT DFB GEN 2 LEAD: CPT | Performed by: INTERNAL MEDICINE

## 2019-07-25 PROCEDURE — 77030018673: Performed by: INTERNAL MEDICINE

## 2019-07-25 PROCEDURE — 74011250637 HC RX REV CODE- 250/637: Performed by: NURSE PRACTITIONER

## 2019-07-25 PROCEDURE — 77030028698 HC BLD TISS PLSM MEDT -D: Performed by: INTERNAL MEDICINE

## 2019-07-25 PROCEDURE — 74011000250 HC RX REV CODE- 250

## 2019-07-25 PROCEDURE — 74011000258 HC RX REV CODE- 258

## 2019-07-25 PROCEDURE — 77030039825 HC MSK NSL PAP SUPERNO2VA VYRM -B: Performed by: NURSE ANESTHETIST, CERTIFIED REGISTERED

## 2019-07-25 PROCEDURE — 77030018729 HC ELECTRD DEFIB PAD CARD -B: Performed by: INTERNAL MEDICINE

## 2019-07-25 PROCEDURE — 77030037029 HC IMPL ENV ICD ANTIBACT ABSRB TYRX MEDT -G: Performed by: INTERNAL MEDICINE

## 2019-07-25 PROCEDURE — 77030037713 HC CLOSR DEV INCIS ZIP STRY -B: Performed by: INTERNAL MEDICINE

## 2019-07-25 PROCEDURE — 77030002996 HC SUT SLK J&J -A: Performed by: INTERNAL MEDICINE

## 2019-07-25 DEVICE — DEFIBRILLATOR CRD 36 J 33 CC 77 GM DF1 CONN EVERA MRI XT DR: Type: IMPLANTABLE DEVICE | Status: FUNCTIONAL

## 2019-07-25 DEVICE — ENVELOPE CMRM6133 ABSORB LRG US
Type: IMPLANTABLE DEVICE | Status: FUNCTIONAL
Brand: TYRX™

## 2019-07-25 RX ORDER — SODIUM CHLORIDE 0.9 % (FLUSH) 0.9 %
5-40 SYRINGE (ML) INJECTION EVERY 8 HOURS
Status: DISCONTINUED | OUTPATIENT
Start: 2019-07-25 | End: 2019-07-25 | Stop reason: HOSPADM

## 2019-07-25 RX ORDER — SODIUM CHLORIDE 0.9 % (FLUSH) 0.9 %
5-40 SYRINGE (ML) INJECTION AS NEEDED
Status: DISCONTINUED | OUTPATIENT
Start: 2019-07-25 | End: 2019-07-25

## 2019-07-25 RX ORDER — FENTANYL CITRATE 50 UG/ML
INJECTION, SOLUTION INTRAMUSCULAR; INTRAVENOUS AS NEEDED
Status: DISCONTINUED | OUTPATIENT
Start: 2019-07-25 | End: 2019-07-25 | Stop reason: HOSPADM

## 2019-07-25 RX ORDER — MORPHINE SULFATE 10 MG/ML
2 INJECTION, SOLUTION INTRAMUSCULAR; INTRAVENOUS
Status: DISCONTINUED | OUTPATIENT
Start: 2019-07-25 | End: 2019-07-25

## 2019-07-25 RX ORDER — FENTANYL CITRATE 50 UG/ML
25 INJECTION, SOLUTION INTRAMUSCULAR; INTRAVENOUS
Status: DISCONTINUED | OUTPATIENT
Start: 2019-07-25 | End: 2019-07-25

## 2019-07-25 RX ORDER — MIDAZOLAM HYDROCHLORIDE 1 MG/ML
INJECTION, SOLUTION INTRAMUSCULAR; INTRAVENOUS AS NEEDED
Status: DISCONTINUED | OUTPATIENT
Start: 2019-07-25 | End: 2019-07-25 | Stop reason: HOSPADM

## 2019-07-25 RX ORDER — BACITRACIN 50000 [IU]/1
INJECTION, POWDER, FOR SOLUTION INTRAMUSCULAR AS NEEDED
Status: DISCONTINUED | OUTPATIENT
Start: 2019-07-25 | End: 2019-07-25 | Stop reason: HOSPADM

## 2019-07-25 RX ORDER — HEPARIN SODIUM 200 [USP'U]/100ML
INJECTION, SOLUTION INTRAVENOUS
Status: COMPLETED | OUTPATIENT
Start: 2019-07-25 | End: 2019-07-25

## 2019-07-25 RX ORDER — SODIUM CHLORIDE, SODIUM LACTATE, POTASSIUM CHLORIDE, CALCIUM CHLORIDE 600; 310; 30; 20 MG/100ML; MG/100ML; MG/100ML; MG/100ML
25 INJECTION, SOLUTION INTRAVENOUS CONTINUOUS
Status: DISCONTINUED | OUTPATIENT
Start: 2019-07-25 | End: 2019-07-25

## 2019-07-25 RX ORDER — NALOXONE HYDROCHLORIDE 0.4 MG/ML
0.4 INJECTION, SOLUTION INTRAMUSCULAR; INTRAVENOUS; SUBCUTANEOUS AS NEEDED
Status: DISCONTINUED | OUTPATIENT
Start: 2019-07-25 | End: 2019-07-25

## 2019-07-25 RX ORDER — ONDANSETRON 2 MG/ML
4 INJECTION INTRAMUSCULAR; INTRAVENOUS AS NEEDED
Status: DISCONTINUED | OUTPATIENT
Start: 2019-07-25 | End: 2019-07-25

## 2019-07-25 RX ORDER — PROPOFOL 10 MG/ML
INJECTION, EMULSION INTRAVENOUS
Status: DISCONTINUED | OUTPATIENT
Start: 2019-07-25 | End: 2019-07-25 | Stop reason: HOSPADM

## 2019-07-25 RX ORDER — DIPHENHYDRAMINE HYDROCHLORIDE 50 MG/ML
12.5 INJECTION, SOLUTION INTRAMUSCULAR; INTRAVENOUS
Status: DISCONTINUED | OUTPATIENT
Start: 2019-07-25 | End: 2019-07-25

## 2019-07-25 RX ORDER — SODIUM CHLORIDE 9 MG/ML
INJECTION, SOLUTION INTRAVENOUS
Status: DISCONTINUED | OUTPATIENT
Start: 2019-07-25 | End: 2019-07-25 | Stop reason: HOSPADM

## 2019-07-25 RX ORDER — HYDROCODONE BITARTRATE AND ACETAMINOPHEN 5; 325 MG/1; MG/1
1 TABLET ORAL
Status: DISCONTINUED | OUTPATIENT
Start: 2019-07-25 | End: 2019-07-25 | Stop reason: HOSPADM

## 2019-07-25 RX ORDER — LIDOCAINE HYDROCHLORIDE 10 MG/ML
0.1 INJECTION, SOLUTION EPIDURAL; INFILTRATION; INTRACAUDAL; PERINEURAL AS NEEDED
Status: DISCONTINUED | OUTPATIENT
Start: 2019-07-25 | End: 2019-07-25

## 2019-07-25 RX ORDER — LIDOCAINE HYDROCHLORIDE 10 MG/ML
INJECTION INFILTRATION; PERINEURAL AS NEEDED
Status: DISCONTINUED | OUTPATIENT
Start: 2019-07-25 | End: 2019-07-25 | Stop reason: HOSPADM

## 2019-07-25 RX ADMIN — SODIUM CHLORIDE: 9 INJECTION, SOLUTION INTRAVENOUS at 08:26

## 2019-07-25 RX ADMIN — HYDROCODONE BITARTRATE AND ACETAMINOPHEN 1 TABLET: 5; 325 TABLET ORAL at 11:21

## 2019-07-25 RX ADMIN — FENTANYL CITRATE 50 MCG: 50 INJECTION, SOLUTION INTRAMUSCULAR; INTRAVENOUS at 08:37

## 2019-07-25 RX ADMIN — FENTANYL CITRATE 50 MCG: 50 INJECTION, SOLUTION INTRAMUSCULAR; INTRAVENOUS at 08:50

## 2019-07-25 RX ADMIN — PROPOFOL 75 MCG/KG/MIN: 10 INJECTION, EMULSION INTRAVENOUS at 08:32

## 2019-07-25 RX ADMIN — MIDAZOLAM HYDROCHLORIDE 2 MG: 1 INJECTION, SOLUTION INTRAMUSCULAR; INTRAVENOUS at 08:30

## 2019-07-25 NOTE — ANESTHESIA POSTPROCEDURE EVALUATION
Procedure(s):  REMOVE & REPLACE ICD DUAL LEADS.    total IV anesthesia, MAC    Anesthesia Post Evaluation        Patient location during evaluation: PACU  Note status: Adequate. Level of consciousness: responsive to verbal stimuli and sleepy but conscious  Pain management: satisfactory to patient  Airway patency: patent  Anesthetic complications: no  Cardiovascular status: acceptable  Respiratory status: acceptable  Hydration status: acceptable  Comments: +Post-Anesthesia Evaluation and Assessment    Patient: Galina Necessary MRN: [id number]  SSN: [id number]   YOB: 1955  Age: 58 y.o. Sex: female      Cardiovascular Function/Vital Signs    /68 (BP 1 Location: Right arm, BP Patient Position: At rest)   Pulse 64   Temp 36.3 °C (97.4 °F)   Resp 18   Ht 5' 6\" (1.676 m)   Wt 113.4 kg (250 lb)   SpO2 95%   Breastfeeding? No   BMI 40.35 kg/m²     Patient is status post Procedure(s):  REMOVE & REPLACE ICD DUAL LEADS. Nausea/Vomiting: Controlled. Postoperative hydration reviewed and adequate. Pain:  Pain Scale 1: Numeric (0 - 10) (07/25/19 0940)  Pain Intensity 1: 0 (07/25/19 0940)   Managed. Neurological Status: At baseline. Mental Status and Level of Consciousness: Arousable. Pulmonary Status:   O2 Device: Room air (07/25/19 0940)   Adequate oxygenation and airway patent. Complications related to anesthesia: None    Post-anesthesia assessment completed. No concerns. Signed By: Alfredo Thrasher MD    7/25/2019  Post anesthesia nausea and vomiting:  controlled      Vitals Value Taken Time   /65 7/25/2019  9:50 AM   Temp     Pulse 65 7/25/2019  9:56 AM   Resp 18 7/25/2019  9:56 AM   SpO2 95 % 7/25/2019  9:56 AM   Vitals shown include unvalidated device data.

## 2019-07-25 NOTE — Clinical Note
Mallampati: Class IV - only hard palate visible. ASA: Class 4 - patient with severe systemic disease that is a constant threat to life.

## 2019-07-25 NOTE — INTERVAL H&P NOTE
H&P Update:  Shane Louis was seen and examined. History and physical has been reviewed. The patient has been examined.  There have been no significant clinical changes since the completion of the originally dated History and Physical.

## 2019-07-25 NOTE — DISCHARGE INSTRUCTIONS
[facility]  [phone]        ICD INSERTION DISCHARGE INSTRUCTIONS    Patient ID:  Arnav Martinez  [id number]  1 y.o.  1955    Admit Date: 7/25/2019    Discharge Date: 7/25/2019     Admitting Physician: Manish Lucas MD     Discharge Physician: Manish Lucas MD    Admission Diagnoses:   ICD (implantable cardioverter-defibrillator) battery depletion [Z45.02]    Discharge Diagnoses: Active Problems:    * No active hospital problems. *      Discharge Condition: Good    Cardiology Procedures this Admission:  S/P ICD implantation. Disposition: home    Reference discharge instructions provided by nursing for diet and activity. Follow-up with ICD/PM clinic in 3 weeks. Call [phone] to make an appointment. Signed:  SHIRA Husain  7/25/2019  9:21 AM      DISCHARGE INSTRUCTIONS FOR PATIENTS WITH ICD'S    1. Remember to call for an appointment in 3 weeks [phone]. 2. Medic Alert Bracelets are available from your pharmacist to wear at all times if you choose to wear one. 3. Carry your ID card for your ICD with you at all times. This card will be given to you in the hospital or mailed to you. 4. The ICD will bulge slightly under your skin. The bulge will decrease in size over the next few weeks. Please notify the doctor's office if you notice any of the following around your ICD site:   A.  A bruise that does not go away. B.  Soreness or yellow, green, or brown drainage from the site. C. Any swelling from the site. D. If you have a fever of 100 degrees or higher that lasts for a few days. INCISION CARE       1.  Leave the dressing over your site until your follow up visit. 2.  You may not shower until after follow up visit. 3.  For comfort, wear loose fitting clothing. 1. 4.  Ice pack to affected shoulder for first 24 hours, wear your sling for 2 days.   2. 5.  Report any signs of infection, fever, pain, swelling, redness, oozing, or heat at site especially if these symptoms increase after the first 3 to 4 days. ACTIVITY PRECAUTIONS     1. Avoid rough contact with the implant site. 2. No driving for 5 days. 3. Avoid lifting your arm over your head, carrying anything on the affected side, or lifting over 10 pounds for 30 days. 4. Any extreme activity such as golf, weight lifting or exercise biking should be restricted for 60 days. 5. Do not carry objects by holding them against your implant site. 6.  No shooting rifles or any type of gun with the affected shoulder permanently. 7.  Welding and chainsaws are prohibited. SPECIAL PRECAUTIONS     1. You should avoid all strong magnetic fields, such as arc welding, large transformers, large motors. Some ICD devices will beep if it detects a strong magnet. If this occurs, move out of the area. 2.  You may or may not have an MRI which uses a strong magnet to take pictures. 3.  Treatments or surgery that requires diathermy or electrocautery should be discussed with your doctor before scheduled. 4.  Avoid radio frequency transmitters, including radar. 5.  Advise dentist or other medical personnel you see that you have an ICD. 6.  Cell phones and microwave oven use is okay. 7.  If you plan to move or take a trip to a new area, the doctor's office will give you a name of a doctor to contact for any problems. SPECIAL INSTRUCTIONS ON SHOCKS     1. Notify your doctor for any of the following:       A. Anytime a shock is received in a 24 hour period. An office visit is not usually required for a single shock. B.  Two or more shocks in a row. If you do not feel well, call the [facility], otherwise call your doctor. This may require an office visit. C. Two or more shocks spaced apart by several hours. This may require an office visit. 2.  Keep a record of events. Include date, time, symptoms and activity at that time.         ANTIBIOTIC THERAPY    During the first 8 weeks after your ICD insertion, you may need antibiotics before any dental work or certain tests or operations. Let the dentist or doctor who is caring for you know that you have had an implanted device.

## 2019-07-25 NOTE — PROGRESS NOTES
Cardiac Cath Lab Recovery Arrival Note:      Arnav Martinez arrived to Cardiac Cath Lab, Recovery Area. Staff introduced to patient. Patient identifiers verified with NAME and DATE OF BIRTH. Procedure verified with patient. Consent forms reviewed and signed by patient or authorized representative and verified. Allergies verified. Patient and family oriented to department. Patient and family informed of procedure and plan of care. Questions answered with review. Patient prepped for procedure, per orders from physician, prior to arrival.    Patient on cardiac monitor, non-invasive blood pressure, SPO2 monitor. On room air. Patient is A&Ox 3. Patient reports no c/o. Patient in stretcher, in low position, with side rails up, call bell within reach, patient instructed to call if assistance as needed. Patient prep in: [address], [place] [address]. Patient family has pager # none  Family in: [address] [facility] waiting area.    Prep by: Tila Javier, NATHANAEL and Ramona Perez RN

## 2019-07-25 NOTE — PROGRESS NOTES
TRANSFER - IN REPORT:    Verbal report received from LAURI Alarcon RN and CRNA on Rudolph Wang  being received from EP  for routine progression of care. Report consisted of patients Situation, Background, Assessment and Recommendations(SBAR). Information from the following report(s) SBAR, Kardex, Procedure Summary and Recent Results was reviewed with the receiving clinician. Opportunity for questions and clarification was provided. Assessment completed upon patients arrival to [address] [place] [address] and care assumed. Cardiac Cath Lab Recovery Arrival Note:    Rudolph Wang arrived to [facility] recovery area. Patient procedure= Gen Change. Patient on cardiac monitor, non-invasive blood pressure, SPO2 monitor. On room air . IV  of NS on pump at Paddy Grate ml/hr. Patient status doing well without problems. Patient is A&Ox 3. Patient reports no complaints. PROCEDURE SITE CHECK:    Procedure site:without any bleeding and no hematorma, no pain/discomfort reported at procedure site. No change in patient status. Continue to monitor patient and status.

## 2019-07-25 NOTE — PROGRESS NOTES
Discharge instructions given and reviewed with patient and brother. All questions and concerns answered. Both verbalized understanding of instructions. Copy of instructions given to brother.

## 2019-07-25 NOTE — ANESTHESIA PREPROCEDURE EVALUATION
Relevant Problems   No relevant active problems       Anesthetic History   No history of anesthetic complications  Increased risk of difficult airway          Review of Systems / Medical History  Patient summary reviewed, nursing notes reviewed and pertinent labs reviewed    Pulmonary          Shortness of breath         Neuro/Psych       CVA  TIA and psychiatric history    Comments: Anxiety Cardiovascular    Hypertension: well controlled      CHF: dyspnea on exertion  Dysrhythmias   Pacemaker and CAD    Exercise tolerance: <4 METS  Comments: SA node dysfunction  Took her beta blocker on schedule this morning   GI/Hepatic/Renal  Within defined limits              Endo/Other      Hypothyroidism: poorly controlled  Morbid obesity     Other Findings            Physical Exam    Airway  Mallampati: IV  TM Distance: 4 - 6 cm  Neck ROM: decreased range of motion, short neck   Mouth opening: Normal     Cardiovascular    Rhythm: irregular  Rate: normal         Dental    Dentition: Caps/crowns, Upper dentition intact and Lower dentition intact     Pulmonary      Decreased breath sounds: bibasilar           Abdominal  GI exam deferred       Other Findings            Anesthetic Plan    ASA: 4  Anesthesia type: total IV anesthesia and MAC    Monitoring Plan: BIS        Anesthetic plan and risks discussed with: Patient and Family

## 2019-08-12 ENCOUNTER — CLINICAL SUPPORT (OUTPATIENT)
Dept: CARDIOLOGY CLINIC | Age: 64
End: 2019-08-12

## 2019-08-12 DIAGNOSIS — Z51.89 VISIT FOR WOUND CHECK: ICD-10-CM

## 2019-08-12 DIAGNOSIS — Z45.02 ICD (IMPLANTABLE CARDIOVERTER-DEFIBRILLATOR) BATTERY DEPLETION: Primary | ICD-10-CM

## 2019-08-12 DIAGNOSIS — Z95.810 ICD (IMPLANTABLE CARDIOVERTER-DEFIBRILLATOR) IN PLACE: Primary | ICD-10-CM

## 2019-08-12 DIAGNOSIS — I42.0 DILATED CARDIOMYOPATHY (HCC): ICD-10-CM

## 2019-08-12 NOTE — PROGRESS NOTES
Vahidmayra Granadosise  1955  Jay Mcgraw MD          Subjective:    Patient is here for 2 week site check and device interrogation after ICD generator implantation. The patient denies chest pain/shortness of breath or fevers.      Patient Active Problem List    Diagnosis Date Noted    ICD (implantable cardioverter-defibrillator) battery depletion 07/25/2019    Obesity, morbid ([address] 07/02/2019    Chronic systolic heart failure ([address] 03/29/2012    Automatic implantable cardiac defibrillator in situ 03/29/2012    Sinoatrial node dysfunction ([address] 03/29/2012    Secondary cardiomyopathy, unspecified 03/29/2012    Essential hypertension, benign 03/29/2012    Cardiomegaly 03/29/2012    Unspecified hypothyroidism 03/29/2012    Shortness of breath 03/29/2012    Cardiomyopathy ([address] 03/29/2012    S/P implantation of automatic cardioverter/defibrillator (AICD) 11/30/2010      Past Medical History:   Diagnosis Date    Automatic implantable cardiac defibrillator in situ 3/29/2012    Heart failure ([address]     Hypertension     Other acute and subacute form of ischemic heart disease     Psychiatric disorder     ANXIETY    Stroke ([address] 2005    TIA    Thyroid disease       Past Surgical History:   Procedure Laterality Date    HX GYN  1980's    MYOMECTOMY    HX OTHER SURGICAL  2005    THYROIDECTOMY    HX PACEMAKER  2010    ICD    HX TONSILLECTOMY      [place] RMVL IMPLTBL DFB PLSE GEN W/RPLCMT PLSE GEN 2 LD N/A 7/25/2019    REMOVE & REPLACE ICD DUAL LEADS performed by Mamta Parikh MD at [facility] CARDIAC CATH LAB     No Known Allergies   Family History   Problem Relation Age of Onset    Kidney Disease Mother     Diabetes Mother     Lung Disease Father     Cancer Father         lung caner    Glaucoma Brother       Current Outpatient Medications   Medication Sig    carvedilol (COREG) 12.5 mg tablet TAKE 1 TABLET BY MOUTH TWICE A DAY WITH MEALS    amLODIPine (NORVASC) 10 mg tablet TAKE 1/2 TABLET BY MOUTH EVERY DAY (Patient taking differently: TAKE 1 TABLET BY MOUTH EVERY DAY)    CALCIUM CARBONATE/VITAMIN D3 (CALCIUM + D PO) Take  by mouth daily.  lisinopril (PRINIVIL, ZESTRIL) 40 mg tablet Take 40 mg by mouth daily.  levothyroxine (SYNTHROID) 150 mcg tablet Take 150 mcg by mouth daily (before breakfast).  hydrochlorothiazide (HYDRODIURIL) 25 mg tablet Take 25 mg by mouth daily.  aspirin 81 mg tablet Take 81 mg by mouth. 2 tabs daily     No current facility-administered medications for this visit. Review of Systems:    General: Pt denies excessive weight gain or loss. Pt is able to conduct ADL's  Respiratory: Denies shortness of breath, GARDUNO, wheezing or stridor. Cardiovascular: Denies precordial pain, palpitations, edema or PND        Physical ExamPhysical Exam:      General: Well developed, in no acute distress. .  Chest: left subclavian pacer site approximated well  Neuro: A&Ox3, speech clear, gait stable. Assessment:   Assessment:     ICD-10-CM ICD-9-CM    1. ICD (implantable cardioverter-defibrillator) in place Z95.810 V45.02    2. Visit for wound check Z51.89 V58.89         Plan:     Patient feels well following ICD generator change. Left subclavian ICD site approximated well, no discharge. No erythema or heat. Device interrogation shows normal functioning with 6.5% AP and 0.1% RVP. Follow up as planned.      Rebecca Gordon NP

## 2019-11-11 ENCOUNTER — OFFICE VISIT (OUTPATIENT)
Dept: CARDIOLOGY CLINIC | Age: 64
End: 2019-11-11

## 2019-11-11 DIAGNOSIS — Z95.810 ICD (IMPLANTABLE CARDIOVERTER-DEFIBRILLATOR) IN PLACE: Primary | ICD-10-CM

## 2019-11-11 DIAGNOSIS — I42.0 DILATED CARDIOMYOPATHY (HCC): ICD-10-CM

## 2020-02-10 ENCOUNTER — OFFICE VISIT (OUTPATIENT)
Dept: CARDIOLOGY CLINIC | Age: 65
End: 2020-02-10

## 2020-02-10 DIAGNOSIS — Z95.810 ICD (IMPLANTABLE CARDIOVERTER-DEFIBRILLATOR) IN PLACE: Primary | ICD-10-CM

## 2020-02-10 DIAGNOSIS — I42.0 DILATED CARDIOMYOPATHY (HCC): ICD-10-CM

## 2020-05-11 ENCOUNTER — OFFICE VISIT (OUTPATIENT)
Dept: CARDIOLOGY CLINIC | Age: 65
End: 2020-05-11

## 2020-05-11 DIAGNOSIS — Z95.810 ICD (IMPLANTABLE CARDIOVERTER-DEFIBRILLATOR) IN PLACE: Primary | ICD-10-CM

## 2020-05-11 DIAGNOSIS — I42.0 DILATED CARDIOMYOPATHY (HCC): ICD-10-CM

## 2021-04-12 ENCOUNTER — OFFICE VISIT (OUTPATIENT)
Dept: CARDIOLOGY CLINIC | Age: 66
End: 2021-04-12
Payer: COMMERCIAL

## 2021-04-12 DIAGNOSIS — I42.0 DILATED CARDIOMYOPATHY (HCC): ICD-10-CM

## 2021-04-12 DIAGNOSIS — Z95.810 ICD (IMPLANTABLE CARDIOVERTER-DEFIBRILLATOR) IN PLACE: Primary | ICD-10-CM

## 2021-04-12 PROCEDURE — 93296 REM INTERROG EVL PM/IDS: CPT | Performed by: INTERNAL MEDICINE

## 2021-04-12 PROCEDURE — 93295 DEV INTERROG REMOTE 1/2/MLT: CPT | Performed by: INTERNAL MEDICINE

## 2021-07-12 ENCOUNTER — OFFICE VISIT (OUTPATIENT)
Dept: CARDIOLOGY CLINIC | Age: 66
End: 2021-07-12
Payer: COMMERCIAL

## 2021-07-12 DIAGNOSIS — Z95.810 ICD (IMPLANTABLE CARDIOVERTER-DEFIBRILLATOR) IN PLACE: Primary | ICD-10-CM

## 2021-07-12 DIAGNOSIS — I42.0 DILATED CARDIOMYOPATHY (HCC): ICD-10-CM

## 2021-07-12 PROCEDURE — 93295 DEV INTERROG REMOTE 1/2/MLT: CPT | Performed by: INTERNAL MEDICINE

## 2021-07-12 PROCEDURE — 93296 REM INTERROG EVL PM/IDS: CPT | Performed by: INTERNAL MEDICINE

## 2021-10-11 ENCOUNTER — OFFICE VISIT (OUTPATIENT)
Dept: CARDIOLOGY CLINIC | Age: 66
End: 2021-10-11
Payer: MEDICARE

## 2021-10-11 DIAGNOSIS — I42.0 DILATED CARDIOMYOPATHY (HCC): ICD-10-CM

## 2021-10-11 DIAGNOSIS — Z95.810 ICD (IMPLANTABLE CARDIOVERTER-DEFIBRILLATOR) IN PLACE: Primary | ICD-10-CM

## 2021-10-11 PROCEDURE — 93295 DEV INTERROG REMOTE 1/2/MLT: CPT | Performed by: INTERNAL MEDICINE

## 2021-10-11 PROCEDURE — 93296 REM INTERROG EVL PM/IDS: CPT | Performed by: INTERNAL MEDICINE

## 2021-12-14 ENCOUNTER — OFFICE VISIT (OUTPATIENT)
Dept: CARDIOLOGY CLINIC | Age: 66
End: 2021-12-14
Payer: MEDICARE

## 2021-12-14 ENCOUNTER — OFFICE VISIT (OUTPATIENT)
Dept: CARDIOLOGY CLINIC | Age: 66
End: 2021-12-14

## 2021-12-14 VITALS
WEIGHT: 247 LBS | RESPIRATION RATE: 18 BRPM | DIASTOLIC BLOOD PRESSURE: 80 MMHG | HEIGHT: 66 IN | BODY MASS INDEX: 39.7 KG/M2 | HEART RATE: 75 BPM | SYSTOLIC BLOOD PRESSURE: 138 MMHG | OXYGEN SATURATION: 98 %

## 2021-12-14 DIAGNOSIS — Z95.810 ICD (IMPLANTABLE CARDIOVERTER-DEFIBRILLATOR) IN PLACE: ICD-10-CM

## 2021-12-14 DIAGNOSIS — I50.22 CHRONIC SYSTOLIC HEART FAILURE (HCC): ICD-10-CM

## 2021-12-14 DIAGNOSIS — Z95.810 ICD (IMPLANTABLE CARDIOVERTER-DEFIBRILLATOR) IN PLACE: Primary | ICD-10-CM

## 2021-12-14 DIAGNOSIS — I42.0 DILATED CARDIOMYOPATHY (HCC): Primary | ICD-10-CM

## 2021-12-14 DIAGNOSIS — I10 ESSENTIAL HYPERTENSION, BENIGN: ICD-10-CM

## 2021-12-14 DIAGNOSIS — I42.0 DILATED CARDIOMYOPATHY (HCC): ICD-10-CM

## 2021-12-14 PROCEDURE — G8536 NO DOC ELDER MAL SCRN: HCPCS | Performed by: INTERNAL MEDICINE

## 2021-12-14 PROCEDURE — G8400 PT W/DXA NO RESULTS DOC: HCPCS | Performed by: INTERNAL MEDICINE

## 2021-12-14 PROCEDURE — G8417 CALC BMI ABV UP PARAM F/U: HCPCS | Performed by: INTERNAL MEDICINE

## 2021-12-14 PROCEDURE — 93283 PRGRMG EVAL IMPLANTABLE DFB: CPT | Performed by: INTERNAL MEDICINE

## 2021-12-14 PROCEDURE — G8427 DOCREV CUR MEDS BY ELIG CLIN: HCPCS | Performed by: INTERNAL MEDICINE

## 2021-12-14 PROCEDURE — 3017F COLORECTAL CA SCREEN DOC REV: CPT | Performed by: INTERNAL MEDICINE

## 2021-12-14 PROCEDURE — 1101F PT FALLS ASSESS-DOCD LE1/YR: CPT | Performed by: INTERNAL MEDICINE

## 2021-12-14 PROCEDURE — G8752 SYS BP LESS 140: HCPCS | Performed by: INTERNAL MEDICINE

## 2021-12-14 PROCEDURE — 1090F PRES/ABSN URINE INCON ASSESS: CPT | Performed by: INTERNAL MEDICINE

## 2021-12-14 PROCEDURE — G8754 DIAS BP LESS 90: HCPCS | Performed by: INTERNAL MEDICINE

## 2021-12-14 PROCEDURE — 93000 ELECTROCARDIOGRAM COMPLETE: CPT | Performed by: INTERNAL MEDICINE

## 2021-12-14 PROCEDURE — G8510 SCR DEP NEG, NO PLAN REQD: HCPCS | Performed by: INTERNAL MEDICINE

## 2021-12-14 PROCEDURE — 99213 OFFICE O/P EST LOW 20 MIN: CPT | Performed by: INTERNAL MEDICINE

## 2021-12-14 RX ORDER — ATORVASTATIN CALCIUM 10 MG/1
10 TABLET, FILM COATED ORAL DAILY
COMMUNITY
Start: 2021-12-03

## 2021-12-14 NOTE — LETTER
12/14/2021    Patient: Johana Beltran   YOB: 1955   Date of Visit: 12/14/2021     Ahlaji Meyer MD  [place] [address]  Via Fax: [phone]    Dear Alhaji Meyer MD,      Thank you for referring Ms. Johana Beltran to [facility] for evaluation. My notes for this consultation are attached. If you have questions, please do not hesitate to call me. I look forward to following your patient along with you.       Sincerely,    Mariano Tinoe MD

## 2021-12-14 NOTE — PROGRESS NOTES
ELECTROPHYSIOLOGY        Subjective:      Ivelisse Bautista is a 78 y.o. female is here for EP follow up. The patient denies chest pain/ shortness of breath, orthopnea, PND, LE edema, palpitations, syncope, presyncope or fatigue.          Patient Active Problem List    Diagnosis Date Noted    ICD (implantable cardioverter-defibrillator) battery depletion 07/25/2019    Obesity, morbid ([address] 07/02/2019    Chronic systolic heart failure ([address] 03/29/2012    Automatic implantable cardiac defibrillator in situ 03/29/2012    Sinoatrial node dysfunction ([address] 03/29/2012    Secondary cardiomyopathy, unspecified 03/29/2012    Essential hypertension, benign 03/29/2012    Cardiomegaly 03/29/2012    Unspecified hypothyroidism 03/29/2012    Shortness of breath 03/29/2012    Cardiomyopathy ([address] 03/29/2012    S/P implantation of automatic cardioverter/defibrillator (AICD) 11/30/2010      Amaya Blankenship MD  Past Medical History:   Diagnosis Date    Automatic implantable cardiac defibrillator in situ 3/29/2012    Heart failure ([address]     Hyperlipidemia     Hypertension     Murmur     Other acute and subacute form of ischemic heart disease     Pacemaker     Psychiatric disorder     ANXIETY    Stroke ([address] 2005    TIA    Thyroid disease       Past Surgical History:   Procedure Laterality Date    HX GYN  1980's    MYOMECTOMY    HX HEART CATHETERIZATION      HX OTHER SURGICAL  2005    THYROIDECTOMY    HX PACEMAKER  2010    ICD    HX TONSILLECTOMY      [place] RMVL IMPLTBL DFB PLSE GEN W/RPLCMT PLSE GEN 2 LD N/A 7/25/2019    REMOVE & REPLACE ICD DUAL LEADS performed by Christofer Jennings MD at [facility] CARDIAC CATH LAB     No Known Allergies   Family History   Problem Relation Age of Onset    Kidney Disease Mother     Diabetes Mother     Lung Disease Father     Cancer Father         lung caner    Glaucoma Brother     negative for cardiac disease  Social History     Socioeconomic History    Marital status:    Tobacco Use    Smoking status: Never Smoker    Smokeless tobacco: Never Used   Vaping Use    Vaping Use: Never used   Substance and Sexual Activity    Alcohol use: No    Drug use: No     Current Outpatient Medications   Medication Sig    atorvastatin (LIPITOR) 10 mg tablet Take 10 mg by mouth daily.  carvedilol (COREG) 12.5 mg tablet TAKE 1 TABLET BY MOUTH TWICE A DAY WITH MEALS    amLODIPine (NORVASC) 10 mg tablet TAKE 1/2 TABLET BY MOUTH EVERY DAY (Patient taking differently: TAKE 1 TABLET BY MOUTH EVERY DAY)    lisinopril (PRINIVIL, ZESTRIL) 40 mg tablet Take 40 mg by mouth daily.  levothyroxine (SYNTHROID) 150 mcg tablet Take 150 mcg by mouth daily (before breakfast).  CALCIUM CARBONATE/VITAMIN D3 (CALCIUM + D PO) Take  by mouth daily. (Patient not taking: Reported on 12/14/2021)    hydrochlorothiazide (HYDRODIURIL) 25 mg tablet Take 25 mg by mouth daily. (Patient not taking: Reported on 12/14/2021)    aspirin 81 mg tablet Take 81 mg by mouth. 2 tabs daily (Patient not taking: Reported on 12/14/2021)     No current facility-administered medications for this visit. Vitals:    12/14/21 0856 12/14/21 0912   BP: (!) 144/82 138/80   Pulse: 75    Resp: 18    SpO2: 98%    Weight: 247 lb (112 kg)    Height: 5' 6\" (1.676 m)        I have reviewed the nurses notes, vitals, problem list, allergy list, medical history, family, social history and medications. Review of Symptoms:  11 systems reviewed, negative other than as stated in the HPI      Physical Exam:      General: Well developed, in no acute distress. HEENT: Eyes - PERRL  Heart:  Normal S1/S2 negative S3 or S4. Regular, no murmur  Respiratory: Clear bilaterally x 4, no wheezing or rales  Extremities:  No edema, no cyanosis. Musculoskeletal: No clubbing  Neuro: A&Ox3, speech clear  Skin: No visible rashes or lesions. Non diaphoretic.  No visible ulcers  Vascular: 2+ pulses symmetric in all extremities  Psych - judgement intact and orientation is [facility]       Cardiographics    Ek21  Sinus 75    Results for orders placed or performed during the hospital encounter of 19   EKG, 12 LEAD, INITIAL   Result Value Ref Range    Ventricular Rate 75 BPM    Atrial Rate 75 BPM    P-R Interval 248 ms    QRS Duration 82 ms    Q-T Interval 396 ms    QTC Calculation (Bezet) 442 ms    Calculated P Axis -15 degrees    Calculated R Axis -31 degrees    Calculated T Axis 9 degrees    Diagnosis       ** Poor data quality, interpretation may be adversely affected  Atrial-paced rhythm with prolonged AV conduction  Left axis deviation  Voltage criteria for left ventricular hypertrophy  When compared with ECG of 2013 09:43,  Electronic atrial pacemaker has replaced Sinus rhythm  Confirmed by Justin Bingham M.D., Berna Baron ([id number]) on 2019 1:09:07 PM           Lab Results   Component Value Date/Time    WBC 3.8 2019 03:49 PM    HGB 12.7 2019 03:49 PM    HCT 39.3 2019 03:49 PM    PLATELET 850  03:49 PM    MCV 93 2019 03:49 PM      Lab Results   Component Value Date/Time    Sodium 140 2019 03:49 PM    Potassium 4.4 2019 03:49 PM    Chloride 98 2019 03:49 PM    CO2 27 2019 03:49 PM    Anion gap 5 2019 12:04 PM    Glucose 81 2019 03:49 PM    BUN 10 2019 03:49 PM    Creatinine 1.05 (H) 2019 03:49 PM    BUN/Creatinine ratio 10 (L) 2019 03:49 PM    GFR est AA 65 2019 03:49 PM    GFR est non-AA 56 (L) 2019 03:49 PM    Calcium 9.8 2019 03:49 PM    Bilirubin, total 0.3 2019 03:49 PM    Alk. phosphatase 66 2019 03:49 PM    Protein, total 8.5 2019 03:49 PM    Albumin 4.4 2019 03:49 PM    Globulin 5.1 (H) 2019 12:04 PM    A-G Ratio 1.1 (L) 2019 03:49 PM    ALT (SGPT) 26 2019 03:49 PM      Lab Results   Component Value Date/Time    TSH 10.20 (H) 2010 09:20 AM           Assessment:           ICD-10-CM ICD-9-CM    1. Dilated cardiomyopathy (HCC)  I42.0 425.4    2. Chronic systolic heart failure (HCC)  I50.22 428.22    3. ICD (implantable cardioverter-defibrillator) in place  Z95.810 V45.02 AMB POC EKG ROUTINE W/ 12 LEADS, INTER & REP   4. Essential hypertension, benign  I10 401.1    5. BMI 39.0-39.9,adult  Z68.39 V85.39      Orders Placed This Encounter    AMB POC EKG ROUTINE W/ 12 LEADS, INTER & REP     Order Specific Question:   Reason for Exam:     Answer:   routine    atorvastatin (LIPITOR) 10 mg tablet     Sig: Take 10 mg by mouth daily. Plan:     Ms Hlida Omalley is here for long follow up and device interrogation. She is 5.9% AP and <0.1% RVP with > 8 years remaining on battery. No significant arrhythmias. During this visit,  the patient and I had a comprehensive discussion of device management using principles of shared decision making. We reviewed device therapy, including the potential risks and benefits of device management. These risks include death, myocardial infarction, stroke, cardiac perforation, vascular injury, injury, pacing induced cardiomyopathy, inappropriate shocks (defibrillator) and other less severe complications. The patient demonstrated a clear understanding of these issues during out discussion. Our plans, determined together after thorough consideration, are outlined else where in this note. Enrolled in remote monitoring and pt will return in 1 year to see us. Addressed all patient questions and concerns at this visit. Continue medical management for HTN. Discussed side effects, efficacy and good medication compliance with pt re: bb and hctz. Thank you for allowing me to participate in Larisa Vuong 's care. Shelly Jimenez NP    Patient seen and examined by me with nurse practitioner. I personally performed all components of the history, physical, and medical decision making and agree with the assessment and plan with minor modifications as noted. Pt is doing well. Long f/u.  A paced 5% for sick sinus. Cont med rx for cardiomyopathy and chf. F/u in one year. Soumya Hernandez MD, [facility]        Patient was made aware during visit today that all testing completed would be instantaneously available on their MyChart for review. Discussed that these results will be made available to the provider at the same time. They were advised to wait at least 3 business days to allow for provider's interpretation of results with follow-up before calling our office with concerns about their results.

## 2021-12-14 NOTE — PROGRESS NOTES
1. Have you been to the ER, urgent care clinic since your last visit? Hospitalized since your last visit? Yes, 7/25/19, [facility], Remove and replace ICD Dual Leads    2. Have you seen or consulted any other health care providers outside of the [address] [place] [address] since your last visit? Include any pap smears or colon screening.  No         Chief Complaint   Patient presents with    Follow-up     C/O  Ankle and leg swelling

## 2022-03-20 PROBLEM — E66.01 OBESITY, MORBID (HCC): Status: ACTIVE | Noted: 2019-07-02

## 2022-03-20 PROBLEM — Z45.02 ICD (IMPLANTABLE CARDIOVERTER-DEFIBRILLATOR) BATTERY DEPLETION: Status: ACTIVE | Noted: 2019-07-25

## 2022-03-21 ENCOUNTER — OFFICE VISIT (OUTPATIENT)
Dept: CARDIOLOGY CLINIC | Age: 67
End: 2022-03-21
Payer: MEDICARE

## 2022-03-21 DIAGNOSIS — I42.0 DILATED CARDIOMYOPATHY (HCC): ICD-10-CM

## 2022-03-21 DIAGNOSIS — I50.22 CHRONIC SYSTOLIC HEART FAILURE (HCC): ICD-10-CM

## 2022-03-21 DIAGNOSIS — Z95.810 ICD (IMPLANTABLE CARDIOVERTER-DEFIBRILLATOR) IN PLACE: Primary | ICD-10-CM

## 2022-03-21 PROCEDURE — 93295 DEV INTERROG REMOTE 1/2/MLT: CPT | Performed by: INTERNAL MEDICINE

## 2022-03-21 PROCEDURE — 93296 REM INTERROG EVL PM/IDS: CPT | Performed by: INTERNAL MEDICINE

## 2022-08-10 ENCOUNTER — TRANSCRIBE ORDER (OUTPATIENT)
Dept: SCHEDULING | Age: 67
End: 2022-08-10

## 2022-08-10 DIAGNOSIS — Z12.31 SCREENING MAMMOGRAM FOR HIGH-RISK PATIENT: Primary | ICD-10-CM

## 2022-08-10 DIAGNOSIS — M81.0 SENILE OSTEOPOROSIS: Primary | ICD-10-CM

## 2022-08-16 ENCOUNTER — HOSPITAL ENCOUNTER (OUTPATIENT)
Dept: BONE DENSITY | Age: 67
Discharge: HOME | End: 2022-08-16
Payer: MEDICARE

## 2022-08-16 ENCOUNTER — HOSPITAL ENCOUNTER (OUTPATIENT)
Dept: MAMMOGRAPHY | Age: 67
Discharge: HOME | End: 2022-08-16
Payer: MEDICARE

## 2022-08-16 DIAGNOSIS — M81.0 SENILE OSTEOPOROSIS: ICD-10-CM

## 2022-08-16 DIAGNOSIS — Z12.31 SCREENING MAMMOGRAM FOR HIGH-RISK PATIENT: ICD-10-CM

## 2022-08-16 PROCEDURE — 77067 SCR MAMMO BI INCL CAD: CPT

## 2022-08-16 PROCEDURE — 77080 DXA BONE DENSITY AXIAL: CPT

## 2024-06-07 ENCOUNTER — HOSPITAL ENCOUNTER (OUTPATIENT)
Facility: HOSPITAL | Age: 69
End: 2024-06-07
Payer: MEDICARE

## 2024-06-07 VITALS — BODY MASS INDEX: 38.57 KG/M2 | WEIGHT: 240 LBS | HEIGHT: 66 IN

## 2024-06-07 DIAGNOSIS — Z12.31 SCREENING MAMMOGRAM FOR HIGH-RISK PATIENT: ICD-10-CM

## 2024-06-07 PROCEDURE — 77067 SCR MAMMO BI INCL CAD: CPT

## 2025-08-06 ENCOUNTER — TRANSCRIBE ORDERS (OUTPATIENT)
Facility: HOSPITAL | Age: 70
End: 2025-08-06

## 2025-08-06 DIAGNOSIS — Z78.0 ASYMPTOMATIC MENOPAUSAL STATE: Primary | ICD-10-CM

## (undated) DEVICE — PACEMAKER PACK: Brand: MEDLINE INDUSTRIES, INC.

## (undated) DEVICE — REM POLYHESIVE ADULT PATIENT RETURN ELECTRODE: Brand: VALLEYLAB

## (undated) DEVICE — IRRIGATION KT PIST SYR 60ML -- CONVERT TO ITEM 116415

## (undated) DEVICE — 3M™ TEGADERM™ TRANSPARENT FILM DRESSING FRAME STYLE, 1626W, 4 IN X 4-3/4 IN (10 CM X 12 CM), 50/CT 4CT/CASE: Brand: 3M™ TEGADERM™

## (undated) DEVICE — LIMB HOLDER, WRIST/ANKLE: Brand: DEROYAL

## (undated) DEVICE — MEDI-TRACE CADENCE ADULT, DEFIBRILLATION ELECTRODE -RTS  (10 PR/PK) - PHILIPS: Brand: MEDI-TRACE CADENCE

## (undated) DEVICE — 3M™ IOBAN™ 2 ANTIMICROBIAL INCISE DRAPE 6650EZ: Brand: IOBAN™ 2

## (undated) DEVICE — IMPLANTABLE DEVICE
Type: IMPLANTABLE DEVICE | Status: NON-FUNCTIONAL
Removed: 2019-07-25

## (undated) DEVICE — PLASMABLADE PS200-040 4.0: Brand: PLASMABLADE™

## (undated) DEVICE — PAD NON-ADHERENT 3X4 STRL LF --

## (undated) DEVICE — SUTURE ABSORBABLE BRAIDED 2-0 CT-1 27 IN UD VICRYL J259H

## (undated) DEVICE — COVER LT HNDL BLU PLAS

## (undated) DEVICE — SUT SLK 0 30IN SH BLK --

## (undated) DEVICE — ZIP 8I SURGICAL SKIN CLOSURE DEVICE: Brand: ZIP 8I SURGICAL SKIN CLOSURE DEVICE

## (undated) DEVICE — MEDI-TRACE CADENCE ADULT, DEFIBRILLATION ELECTRODE -RTS  (10 PR/PK) - PHYSIO-CONTROL: Brand: MEDI-TRACE CADENCE